# Patient Record
(demographics unavailable — no encounter records)

---

## 2017-02-06 NOTE — US
EXAMINATION TYPE: US kidneys/renal and bladder

 

DATE OF EXAM: 2/6/2017 9:50 AM

 

COMPARISON: 6/9/2016

 

CLINICAL HISTORY: Abd Pain R10.84. Left flank pain and left pelvic pain; renal stones, UTI; patient s
guzman is on antibiotics for urinary infection, and also had surgery on left kidney as a child

 

EXAM MEASUREMENTS:

 

Right Kidney:  11.0 x 4.8 x 3.6 cm

Left Kidney: 13.0 x 5.8 x 5.7 cm

Post Void Residual Volume:  10.5 mL

 

Findings:

 

Right Kidney: multiple renal calcifications are noted with largest in superior pole = 0.8 x 0.4 x 0.3
cm; mild hydronephrosis noted upper pole   

Left Kidney: moderate hydronephrosis; multiple renal calcifications noted throughout with largest at 
inferior pole =1.1 x 0.8 x 0.6cm   

Bladder: posterior wall stones especially noted left posterior bladder = 0.4 x 0.4 x 0.2cm and seen p
re and post void 

**Bilateral Jets seen: no, only right ureteral jet was seen within 3 minute observation

**Normal Post Void Residual: Yes

 

 

 

IMPRESSION:

 

1. Mild right-sided hydronephrosis and nephrolithiasis.

2. Moderate left-sided hydronephrosis with multiple calculi seen as discussed above.

3. Urinary bladder calculi.